# Patient Record
Sex: FEMALE | Race: WHITE
[De-identification: names, ages, dates, MRNs, and addresses within clinical notes are randomized per-mention and may not be internally consistent; named-entity substitution may affect disease eponyms.]

---

## 2017-03-29 ENCOUNTER — HOSPITAL ENCOUNTER (OUTPATIENT)
Dept: HOSPITAL 45 - C.LABPVFM | Age: 37
Discharge: HOME | End: 2017-03-29
Attending: FAMILY MEDICINE
Payer: COMMERCIAL

## 2017-03-29 DIAGNOSIS — R53.83: ICD-10-CM

## 2017-03-29 DIAGNOSIS — R05: Primary | ICD-10-CM

## 2017-03-29 DIAGNOSIS — E55.9: ICD-10-CM

## 2017-03-29 LAB
ALBUMIN/GLOB SERPL: 1.3 {RATIO} (ref 0.9–2)
ALP SERPL-CCNC: 52 U/L (ref 45–117)
ALT SERPL-CCNC: 63 U/L (ref 12–78)
ANION GAP SERPL CALC-SCNC: 6 MMOL/L (ref 3–11)
AST SERPL-CCNC: 35 U/L (ref 15–37)
BASOPHILS # BLD: 0.02 K/UL (ref 0–0.2)
BASOPHILS NFR BLD: 0.2 %
BUN SERPL-MCNC: 18 MG/DL (ref 7–18)
BUN/CREAT SERPL: 18 (ref 10–20)
CALCIUM SERPL-MCNC: 9.2 MG/DL (ref 8.5–10.1)
CHLORIDE SERPL-SCNC: 108 MMOL/L (ref 98–107)
CO2 SERPL-SCNC: 28 MMOL/L (ref 21–32)
COMPLETE: YES
CREAT SERPL-MCNC: 0.99 MG/DL (ref 0.6–1.2)
EOSINOPHIL NFR BLD AUTO: 228 K/UL (ref 130–400)
GLOBULIN SER-MCNC: 3.1 GM/DL (ref 2.5–4)
GLUCOSE SERPL-MCNC: 109 MG/DL (ref 70–99)
HCT VFR BLD CALC: 43.8 % (ref 37–47)
IG%: 0.4 %
IMM GRANULOCYTES NFR BLD AUTO: 30.5 %
LYMPHOCYTES # BLD: 3.48 K/UL (ref 1.2–3.4)
MCH RBC QN AUTO: 30.1 PG (ref 25–34)
MCHC RBC AUTO-ENTMCNC: 34.7 G/DL (ref 32–36)
MCV RBC AUTO: 86.7 FL (ref 80–100)
MONOCYTES NFR BLD: 7.4 %
NEUTROPHILS # BLD AUTO: 2.8 %
NEUTROPHILS NFR BLD AUTO: 58.7 %
PMV BLD AUTO: 12.5 FL (ref 7.4–10.4)
POTASSIUM SERPL-SCNC: 4.3 MMOL/L (ref 3.5–5.1)
RBC # BLD AUTO: 5.05 M/UL (ref 4.2–5.4)
RHEUMATOID FACT FLD-ACNC: < 10 U/ML (ref 0–15)
SODIUM SERPL-SCNC: 142 MMOL/L (ref 136–145)
WBC # BLD AUTO: 11.42 K/UL (ref 4.8–10.8)

## 2017-03-29 NOTE — DIAGNOSTIC IMAGING REPORT
CHEST 2 VIEWS ROUTINE



CLINICAL HISTORY: Cough.    



COMPARISON STUDY:  No previous studies for comparison.



FINDINGS: Lung volumes are normal. Lungs are clear. There is no pneumothorax or

pleural effusion. Cardiac size is normal. Mediastinal contours are normal. There

is no evidence of pulmonary edema. 



IMPRESSION:  No acute cardiopulmonary findings. 









Electronically signed by:  Anjel Botello M.D.

3/29/2017 12:14 PM



Dictated Date/Time:  3/29/2017 12:14 PM

## 2017-03-31 LAB
ENA SS-A IGG SER QL IA: (no result) AI
ENA SS-B AB SER-ACNC: (no result) AI

## 2017-04-05 NOTE — CODING QUERY MEDICAL NECESSITY
SUPPORTING DIAGNOSIS NEEDED





A supporting diagnosis is required for the test/procedure performed on this patient in 
order for us to be reimbursed by the patient's insurance. Please provide a supporting 
diagnosis for the following test/procedure listed below next to the test name along with 
your signature. 



*If there is no additional diagnosis for this patient that would support the following 
test/procedure please document that below next to the test/procedure.



Test(s)/Procedure(s) that require a supporting diagnosis:

  *  VITAMIN D 25-HYDROXY      DIAGNOSIS:

  *  VITAMIN B-12 LEVEL       DIAGNOSIS:

  *  DOS: 3/29/17



Provider Signature:  ______________________________  Date:  _______



Thank you  

Ana Laura Rudolph

Health Information Management

Phone:  [phone]

Fax:  [phone]



Once completed, please kindly fax back to [phone]



For questions please call [phone]

## 2018-04-13 ENCOUNTER — HOSPITAL ENCOUNTER (OUTPATIENT)
Dept: HOSPITAL 45 - C.LABPVFM | Age: 38
Discharge: HOME | End: 2018-04-13
Attending: NURSE PRACTITIONER
Payer: COMMERCIAL

## 2018-04-13 DIAGNOSIS — E03.9: Primary | ICD-10-CM

## 2018-04-13 DIAGNOSIS — E55.9: ICD-10-CM

## 2018-08-22 ENCOUNTER — HOSPITAL ENCOUNTER (OUTPATIENT)
Dept: HOSPITAL 45 - C.LABPVFM | Age: 38
Discharge: HOME | End: 2018-08-22
Attending: NURSE PRACTITIONER
Payer: COMMERCIAL

## 2018-08-22 DIAGNOSIS — E55.9: Primary | ICD-10-CM
